# Patient Record
Sex: MALE | Race: WHITE | NOT HISPANIC OR LATINO | Employment: FULL TIME | URBAN - NONMETROPOLITAN AREA
[De-identification: names, ages, dates, MRNs, and addresses within clinical notes are randomized per-mention and may not be internally consistent; named-entity substitution may affect disease eponyms.]

---

## 2023-05-07 ENCOUNTER — HOSPITAL ENCOUNTER (EMERGENCY)
Facility: HOSPITAL | Age: 63
Discharge: HOME | End: 2023-05-07
Attending: EMERGENCY MEDICINE

## 2023-05-07 VITALS
SYSTOLIC BLOOD PRESSURE: 149 MMHG | RESPIRATION RATE: 18 BRPM | DIASTOLIC BLOOD PRESSURE: 76 MMHG | HEART RATE: 85 BPM | WEIGHT: 234 LBS | HEIGHT: 66 IN | OXYGEN SATURATION: 96 % | BODY MASS INDEX: 37.61 KG/M2 | TEMPERATURE: 98.1 F

## 2023-05-07 DIAGNOSIS — K04.7 DENTAL INFECTION: Primary | ICD-10-CM

## 2023-05-07 RX ORDER — CLINDAMYCIN HYDROCHLORIDE 300 MG/1
300 CAPSULE ORAL 4 TIMES DAILY
Qty: 28 CAPSULE | Refills: 0 | Status: SHIPPED | OUTPATIENT
Start: 2023-05-07 | End: 2023-05-14

## 2023-05-07 RX ORDER — CLINDAMYCIN HYDROCHLORIDE 150 MG/1
600 CAPSULE ORAL ONCE
Status: COMPLETED | OUTPATIENT
Start: 2023-05-07 | End: 2023-05-07

## 2023-05-07 RX ADMIN — CLINDAMYCIN HYDROCHLORIDE 600 MG: 150 CAPSULE ORAL at 19:00

## 2023-05-07 NOTE — ED PROVIDER NOTES
History  Chief Complaint   Patient presents with   • Dental Pain     Pt having dental pain on L side of mouth  States he hasn't been to the dentist in a few years and has been having the pain for a few days that radiates up left side of face     Patient patient is a 55-year-old male who presents to the emergency department today with a complaint of left-sided pain for the last 2 to 3 days  Patient states the pain is worsened  No dentist in over 3 years  Called and made an appointment for Wednesday with [facility]   Is concerned for infection therefore he came in for evaluation  No difficulty swallowing, fevers chills shortness of breath chest pain  Dental Pain  Location:  Upper  Upper teeth location:  12/JUSTINO 1st bicuspid, 9/JUSTINO central incisor and 8/RU central incisor  Quality:  Throbbing  Severity:  Moderate  Onset quality:  Unable to specify  Timing:  Constant  Progression:  Worsening  Chronicity:  New  Context: dental caries and poor dentition    Relieved by:  Nothing  Worsened by: Hot food/drink and cold food/drink  Ineffective treatments:  Acetaminophen, NSAIDs and topical anesthetic gel  Associated symptoms: no difficulty swallowing, no facial pain, no fever, no gum swelling, no neck pain, no neck swelling and no oral lesions        None       History reviewed  No pertinent past medical history  History reviewed  No pertinent surgical history  History reviewed  No pertinent family history  I have reviewed and agree with the history as documented  E-Cigarette/Vaping     E-Cigarette/Vaping Substances          Review of Systems   Constitutional: Negative for fever  HENT: Negative for mouth sores  Musculoskeletal: Negative for neck pain  All other systems reviewed and are negative  Physical Exam  Physical Exam  Vitals and nursing note reviewed  Constitutional:       General: He is not in acute distress  Appearance: He is well-developed     HENT:      Head: Normocephalic and atraumatic  Mouth/Throat:      Mouth: Mucous membranes are moist       Dentition: Abnormal dentition  Dental tenderness and dental caries present  No dental abscesses  Pharynx: Oropharynx is clear  Uvula midline  Eyes:      Pupils: Pupils are equal, round, and reactive to light  Cardiovascular:      Rate and Rhythm: Normal rate and regular rhythm  Heart sounds: No murmur heard  Pulmonary:      Effort: Pulmonary effort is normal  No respiratory distress  Breath sounds: Normal breath sounds  Abdominal:      General: Bowel sounds are normal       Palpations: Abdomen is soft  Tenderness: There is no abdominal tenderness  Musculoskeletal:      Cervical back: Normal range of motion  Skin:     General: Skin is warm and dry  Capillary Refill: Capillary refill takes less than 2 seconds  Neurological:      Mental Status: He is alert and oriented to person, place, and time  Psychiatric:         Behavior: Behavior normal          Vital Signs  ED Triage Vitals [05/07/23 1841]   Temperature Pulse Respirations Blood Pressure SpO2   98 1 °F (36 7 °C) 85 18 149/76 96 %      Temp src Heart Rate Source Patient Position - Orthostatic VS BP Location FiO2 (%)   -- Monitor Sitting Left arm --      Pain Score       --           Vitals:    05/07/23 1841   BP: 149/76   Pulse: 85   Patient Position - Orthostatic VS: Sitting         Visual Acuity      ED Medications  Medications   clindamycin (CLEOCIN) capsule 600 mg (600 mg Oral Given 5/7/23 1900)       Diagnostic Studies  Results Reviewed     None                 No orders to display              Procedures  Procedures         ED Course                                             Medical Decision Making  Patient patient is a 56-year-old male who presents to the emergency department today with a complaint of left-sided pain for the last 2 to 3 days  Patient states the pain is worsened  No dentist in over 3 years    Called and made an appointment for Wednesday with [facility]   Is concerned for infection therefore he came in for evaluation  No difficulty swallowing, fevers chills shortness of breath chest pain  No abscess on examination  Clindamycin instructed to follow-up with dentistry on Wednesday as previously scheduled    Risk  Prescription drug management  Disposition  Final diagnoses:   Dental infection     Time reflects when diagnosis was documented in both MDM as applicable and the Disposition within this note     Time User Action Codes Description Comment    5/7/2023  6:54 PM [address] [[address] 7] Dental infection       ED Disposition     ED Disposition   Discharge    Condition   Stable    Date/Time   Sun May 7, 2023  6:54 PM    Comment   Poncho Harrington discharge to home/self care  Follow-up Information    None         Discharge Medication List as of 5/7/2023  6:55 PM      START taking these medications    Details   clindamycin (CLEOCIN) 300 MG capsule Take 1 capsule (300 mg total) by mouth 4 (four) times a day for 7 days, Starting Sun 5/7/2023, Until Sun 5/14/2023, Normal             No discharge procedures on file      PDMP Review     None          ED Provider  Electronically Signed by           Jason Rolle PA-C  05/07/23 9183

## 2023-05-07 NOTE — Clinical Note
eDvi Arauz was seen and treated in our emergency department on 5/7/2023  Diagnosis:     Tila Arroyo  may return to school on return date  He may return on this date: 05/10/2023         If you have any questions or concerns, please don't hesitate to call        Franko Campbell PA-C    ______________________________           _______________          _______________  Hospital Representative                              Date                                Time